# Patient Record
Sex: MALE | Race: WHITE | Employment: FULL TIME | URBAN - METROPOLITAN AREA
[De-identification: names, ages, dates, MRNs, and addresses within clinical notes are randomized per-mention and may not be internally consistent; named-entity substitution may affect disease eponyms.]

---

## 2021-04-18 ENCOUNTER — HOSPITAL ENCOUNTER (OUTPATIENT)
Age: 41
Discharge: HOME | End: 2021-04-18
Payer: COMMERCIAL

## 2021-04-18 VITALS
DIASTOLIC BLOOD PRESSURE: 102 MMHG | OXYGEN SATURATION: 98 % | WEIGHT: 150 LBS | HEIGHT: 66 IN | RESPIRATION RATE: 20 BRPM | TEMPERATURE: 98 F | SYSTOLIC BLOOD PRESSURE: 151 MMHG | HEART RATE: 88 BPM | BODY MASS INDEX: 24.11 KG/M2

## 2021-04-18 DIAGNOSIS — M62.838 MUSCLE SPASMS OF NECK: Primary | ICD-10-CM

## 2021-04-18 DIAGNOSIS — R03.0 ELEVATED BLOOD PRESSURE READING: ICD-10-CM

## 2021-04-18 PROCEDURE — 99203 OFFICE O/P NEW LOW 30 MIN: CPT | Performed by: NURSE PRACTITIONER

## 2021-04-18 RX ORDER — METHYLPREDNISOLONE 4 MG/1
TABLET ORAL
Qty: 1 PACKAGE | Refills: 0 | Status: SHIPPED | OUTPATIENT
Start: 2021-04-18 | End: 2021-08-12

## 2021-04-18 RX ORDER — CYCLOBENZAPRINE HCL 10 MG
10 TABLET ORAL 3 TIMES DAILY PRN
Qty: 20 TABLET | Refills: 0 | Status: SHIPPED | OUTPATIENT
Start: 2021-04-18 | End: 2021-04-25

## 2021-04-18 NOTE — ED QUICK NOTES
Pt aware of his elevated blood pressure. Instructed to f/u with his pmd and have his blood pressure re-evaluated. If he experiences any headaches, dizziness, cp, sob to go directly to the ED. Verbalized understanding.

## 2021-04-18 NOTE — ED INITIAL ASSESSMENT (HPI)
Pt presents to the IC with c/o left sided neck pain, acute on chronic, progressively getting worse. Pt notes it flairs up when he is stressed.  Pt took his wife's meloxicam

## 2021-04-18 NOTE — ED PROVIDER NOTES
Patient presents with:  Neck Pain      HPI:     Calvin Weinberg is a 45year old male who presents for a left posterior neck spasm that started a couple days ago. The patient states he has a chronic history of this for the past 14 years.   He states he daniel in the Last Year:       Ran Out of Food in the Last Year:   Transportation Needs:       Lack of Transportation (Medical):       Lack of Transportation (Non-Medical):   Physical Activity:       Days of Exercise per Week:       Minutes of Exercise per [employer] appropriate. MDM/Assessment/Plan:   Orders for this encounter:    Orders Placed This Encounter      cyclobenzaprine 10 MG Oral Tab          Sig: Take 1 tablet (10 mg total) by mouth 3 (three) times daily as needed for Muscle spasms.           Dispense:

## 2021-04-18 NOTE — ED QUICK NOTES
Pt reports a hx of needing botox injections in his neck for the pain, last done approx 6-7 years ago

## 2021-04-24 ENCOUNTER — HOSPITAL ENCOUNTER (OUTPATIENT)
Age: 41
Discharge: HOME | End: 2021-04-24
Payer: COMMERCIAL

## 2021-04-24 VITALS
OXYGEN SATURATION: 99 % | HEART RATE: 92 BPM | DIASTOLIC BLOOD PRESSURE: 105 MMHG | TEMPERATURE: 98 F | RESPIRATION RATE: 18 BRPM | SYSTOLIC BLOOD PRESSURE: 150 MMHG

## 2021-04-24 DIAGNOSIS — M62.838 SPASM OF MUSCLE: Primary | ICD-10-CM

## 2021-04-24 PROCEDURE — 99213 OFFICE O/P EST LOW 20 MIN: CPT | Performed by: NURSE PRACTITIONER

## 2021-04-24 RX ORDER — CARISOPRODOL 350 MG/1
350 TABLET ORAL 3 TIMES DAILY PRN
Qty: 15 TABLET | Refills: 0 | Status: SHIPPED | OUTPATIENT
Start: 2021-04-24 | End: 2021-05-01

## 2021-04-24 RX ORDER — PREDNISONE 20 MG/1
60 TABLET ORAL DAILY
Qty: 15 TABLET | Refills: 0 | Status: SHIPPED | OUTPATIENT
Start: 2021-04-24 | End: 2021-04-29

## 2021-04-24 NOTE — ED PROVIDER NOTES
Patient Seen in: [facility]    History   CC: neck spasms   HPI: Tessy Romero 45year old male with history of cervical dystonia c/o left-sided neck spasms which he advises is typically brought on by exceptionally high levels of stress.   Sta HPI.  Constitutional and vital signs reviewed. All other systems reviewed and negative except as noted above. PSFH elements reviewed from today and agreed except as otherwise stated in HPI.     Medications :   Carisoprodol (SOMA) 350 MG Oral Tab,  T upper extremities, steady gait  MSK - makes purposeful movements of all extremities with full ROM noted, hand grasp strength equal bilat, radial pulses 2+ bilat.   no midline spinal tenderness or obvious sign of trauma/swelling/deformity, +flexion of the 1s Muscle spasms. Qty: 15 tablet Refills: 0    predniSONE 20 MG Oral Tab  Take 3 tablets (60 mg total) by mouth daily for 5 days.   Qty: 15 tablet Refills: 0

## 2022-01-09 ENCOUNTER — HOSPITAL ENCOUNTER (OUTPATIENT)
Age: 42
Discharge: HOME | End: 2022-01-09
Payer: COMMERCIAL

## 2022-01-09 VITALS
HEART RATE: 84 BPM | RESPIRATION RATE: 16 BRPM | OXYGEN SATURATION: 99 % | TEMPERATURE: 98 F | WEIGHT: 160 LBS | BODY MASS INDEX: 25.71 KG/M2 | HEIGHT: 66 IN | DIASTOLIC BLOOD PRESSURE: 89 MMHG | SYSTOLIC BLOOD PRESSURE: 120 MMHG

## 2022-01-09 DIAGNOSIS — M70.41 PREPATELLAR BURSITIS OF RIGHT KNEE: Primary | ICD-10-CM

## 2022-01-09 DIAGNOSIS — L03.115 CELLULITIS OF RIGHT LOWER EXTREMITY: ICD-10-CM

## 2022-01-09 PROCEDURE — 99213 OFFICE O/P EST LOW 20 MIN: CPT | Performed by: NURSE PRACTITIONER

## 2022-01-09 RX ORDER — CEPHALEXIN 500 MG/1
500 CAPSULE ORAL 4 TIMES DAILY
Qty: 40 CAPSULE | Refills: 0 | Status: SHIPPED | OUTPATIENT
Start: 2022-01-09 | End: 2022-01-19

## 2022-01-09 NOTE — ED PROVIDER NOTES
Patient Seen in: [facility]    History   CC: knee pain  HPI: Clifton Jungalber 37year old male  who presents c/o right knee pain which began after being on his knees assembling a play gym for his kids. +swollen and red per pt.  Pain exacerbated anterior knee. No surrounding erythema or edema associated. No open wounds.   Skin is otherwise pink warm and dry throughout, mmm, no other obvious signs of swelling/trauma/deformity, cap refill <2seconds distal LE  Neuro - A&O x4, sensation equal to both medications    cephalexin 500 MG Oral Cap  Take 1 capsule (500 mg total) by mouth 4 (four) times daily for 10 days.   Qty: 40 capsule Refills: 0

## 2022-01-09 NOTE — ED INITIAL ASSESSMENT (HPI)
Pt c/o pain and swelling to R knee since yesterday. No falls or injury. Pt with own crutches. Positive redness and swelling noted to R knee.

## 2022-11-14 ENCOUNTER — HOSPITAL ENCOUNTER (OUTPATIENT)
Age: 42
Discharge: HOME | End: 2022-11-14
Payer: COMMERCIAL

## 2022-11-14 VITALS
HEART RATE: 99 BPM | DIASTOLIC BLOOD PRESSURE: 88 MMHG | TEMPERATURE: 99 F | SYSTOLIC BLOOD PRESSURE: 160 MMHG | RESPIRATION RATE: 16 BRPM | OXYGEN SATURATION: 97 %

## 2022-11-14 DIAGNOSIS — J11.1 INFLUENZA: Primary | ICD-10-CM

## 2022-11-14 LAB
POCT INFLUENZA A: POSITIVE
POCT INFLUENZA B: NEGATIVE

## 2022-11-14 PROCEDURE — 87502 INFLUENZA DNA AMP PROBE: CPT | Performed by: NURSE PRACTITIONER

## 2022-11-14 PROCEDURE — 99213 OFFICE O/P EST LOW 20 MIN: CPT | Performed by: NURSE PRACTITIONER

## 2022-11-14 RX ORDER — OSELTAMIVIR PHOSPHATE 75 MG/1
75 CAPSULE ORAL 2 TIMES DAILY
Qty: 10 CAPSULE | Refills: 0 | Status: SHIPPED | OUTPATIENT
Start: 2022-11-14 | End: 2022-11-19

## 2022-11-14 NOTE — ED INITIAL ASSESSMENT (HPI)
Pt c/o post nasal drip, sore throat, runny nose, headache, body aches x2 days. Wife tested positive for flu this am here at [facility].   Neg home covid test this am.

## (undated) NOTE — LETTER
Date & Time: 11/14/2022, 11:46 AM  Patient: Aurea Servin  Encounter Provider(s):    DONI Lucas       To Whom It May Concern:    Jolynn Simental was seen and treated in our department on 11/14/2022. He should be excused from work thru 11/17/2022 and until fever free for 24 hours without the use of fever reducing medication.     If you have any questions or concerns, please do not hesitate to call.        _____________________________  Physician/[facility] Signature